# Patient Record
Sex: FEMALE | Race: WHITE
[De-identification: names, ages, dates, MRNs, and addresses within clinical notes are randomized per-mention and may not be internally consistent; named-entity substitution may affect disease eponyms.]

---

## 2021-02-19 ENCOUNTER — HOSPITAL ENCOUNTER (OUTPATIENT)
Dept: HOSPITAL 95 - ORSCSDS | Age: 56
Discharge: HOME | End: 2021-02-19
Attending: INTERNAL MEDICINE
Payer: COMMERCIAL

## 2021-02-19 VITALS — BODY MASS INDEX: 44.38 KG/M2 | WEIGHT: 244.27 LBS | HEIGHT: 62.01 IN

## 2021-02-19 DIAGNOSIS — I10: ICD-10-CM

## 2021-02-19 DIAGNOSIS — D12.4: ICD-10-CM

## 2021-02-19 DIAGNOSIS — K64.8: ICD-10-CM

## 2021-02-19 DIAGNOSIS — Z12.11: Primary | ICD-10-CM

## 2021-02-19 DIAGNOSIS — E66.01: ICD-10-CM

## 2021-02-19 DIAGNOSIS — K57.30: ICD-10-CM

## 2021-02-19 DIAGNOSIS — Z79.899: ICD-10-CM

## 2021-02-19 PROCEDURE — 0DBM8ZX EXCISION OF DESCENDING COLON, VIA NATURAL OR ARTIFICIAL OPENING ENDOSCOPIC, DIAGNOSTIC: ICD-10-PCS | Performed by: INTERNAL MEDICINE

## 2021-10-04 ENCOUNTER — HOSPITAL ENCOUNTER (OUTPATIENT)
Dept: HOSPITAL 95 - LAB SHORT | Age: 56
Discharge: HOME | End: 2021-10-04
Attending: STUDENT IN AN ORGANIZED HEALTH CARE EDUCATION/TRAINING PROGRAM
Payer: COMMERCIAL

## 2021-10-04 DIAGNOSIS — L57.0: Primary | ICD-10-CM

## 2022-10-18 ENCOUNTER — HOSPITAL ENCOUNTER (INPATIENT)
Dept: HOSPITAL 95 - ER | Age: 57
LOS: 3 days | Discharge: HOME | DRG: 917 | End: 2022-10-21
Attending: STUDENT IN AN ORGANIZED HEALTH CARE EDUCATION/TRAINING PROGRAM | Admitting: STUDENT IN AN ORGANIZED HEALTH CARE EDUCATION/TRAINING PROGRAM
Payer: COMMERCIAL

## 2022-10-18 VITALS — BODY MASS INDEX: 32.24 KG/M2 | HEIGHT: 66 IN | WEIGHT: 200.62 LBS

## 2022-10-18 DIAGNOSIS — D63.1: ICD-10-CM

## 2022-10-18 DIAGNOSIS — Z91.030: ICD-10-CM

## 2022-10-18 DIAGNOSIS — E66.9: ICD-10-CM

## 2022-10-18 DIAGNOSIS — Z88.8: ICD-10-CM

## 2022-10-18 DIAGNOSIS — G92.8: ICD-10-CM

## 2022-10-18 DIAGNOSIS — J96.01: ICD-10-CM

## 2022-10-18 DIAGNOSIS — Z90.710: ICD-10-CM

## 2022-10-18 DIAGNOSIS — M06.852: ICD-10-CM

## 2022-10-18 DIAGNOSIS — Z88.2: ICD-10-CM

## 2022-10-18 DIAGNOSIS — Z79.899: ICD-10-CM

## 2022-10-18 DIAGNOSIS — M06.851: ICD-10-CM

## 2022-10-18 DIAGNOSIS — F32.A: ICD-10-CM

## 2022-10-18 DIAGNOSIS — F41.8: ICD-10-CM

## 2022-10-18 DIAGNOSIS — M06.861: ICD-10-CM

## 2022-10-18 DIAGNOSIS — E83.39: ICD-10-CM

## 2022-10-18 DIAGNOSIS — G47.33: ICD-10-CM

## 2022-10-18 DIAGNOSIS — N18.30: ICD-10-CM

## 2022-10-18 DIAGNOSIS — E87.29: ICD-10-CM

## 2022-10-18 DIAGNOSIS — N39.0: ICD-10-CM

## 2022-10-18 DIAGNOSIS — J44.9: ICD-10-CM

## 2022-10-18 DIAGNOSIS — J96.02: ICD-10-CM

## 2022-10-18 DIAGNOSIS — Z98.890: ICD-10-CM

## 2022-10-18 DIAGNOSIS — Z88.0: ICD-10-CM

## 2022-10-18 DIAGNOSIS — T40.601A: Primary | ICD-10-CM

## 2022-10-18 DIAGNOSIS — N17.9: ICD-10-CM

## 2022-10-18 DIAGNOSIS — E88.09: ICD-10-CM

## 2022-10-18 DIAGNOSIS — M06.862: ICD-10-CM

## 2022-10-18 DIAGNOSIS — I12.9: ICD-10-CM

## 2022-10-18 LAB
ALBUMIN SERPL BCP-MCNC: 3.3 G/DL (ref 3.4–5)
ALBUMIN SERPL BCP-MCNC: 3.4 G/DL (ref 3.4–5)
ALBUMIN/GLOB SERPL: 0.9 {RATIO} (ref 0.8–1.8)
ALT SERPL W P-5'-P-CCNC: 22 U/L (ref 12–78)
ANION GAP SERPL CALCULATED.4IONS-SCNC: 7 MMOL/L (ref 6–16)
ANION GAP SERPL CALCULATED.4IONS-SCNC: 9 MMOL/L (ref 6–16)
AST SERPL W P-5'-P-CCNC: 24 U/L (ref 12–37)
BASOPHILS # BLD AUTO: 0.03 K/MM3 (ref 0–0.23)
BASOPHILS NFR BLD AUTO: 0 % (ref 0–2)
BENZODIAZ UR-MCNC: DETECTED UG/L
BILIRUB SERPL-MCNC: 0.3 MG/DL (ref 0.1–1)
BILIRUB UR QL STRIP: (no result)
BUN SERPL-MCNC: 22 MG/DL (ref 8–24)
BUN SERPL-MCNC: 23 MG/DL (ref 8–24)
CALCIUM SERPL-MCNC: 9.2 MG/DL (ref 8.5–10.1)
CALCIUM SERPL-MCNC: 9.5 MG/DL (ref 8.5–10.1)
CHLORIDE SERPL-SCNC: 109 MMOL/L (ref 98–108)
CHLORIDE SERPL-SCNC: 110 MMOL/L (ref 98–108)
CO2 SERPL-SCNC: 22 MMOL/L (ref 21–32)
CO2 SERPL-SCNC: 24 MMOL/L (ref 21–32)
CREAT SERPL-MCNC: 3.39 MG/DL (ref 0.4–1)
CREAT SERPL-MCNC: 3.73 MG/DL (ref 0.4–1)
DEPRECATED RDW RBC AUTO: 50.2 FL (ref 35.1–46.3)
EOSINOPHIL # BLD AUTO: 0.16 K/MM3 (ref 0–0.68)
EOSINOPHIL NFR BLD AUTO: 2 % (ref 0–6)
ERYTHROCYTE [DISTWIDTH] IN BLOOD BY AUTOMATED COUNT: 14.6 % (ref 11.7–14.2)
ETHANOL SERPL-MCNC: <3 MG/DL
GLOBULIN SER CALC-MCNC: 3.6 G/DL (ref 2.2–4)
GLUCOSE SERPL-MCNC: 117 MG/DL (ref 70–99)
GLUCOSE SERPL-MCNC: 155 MG/DL (ref 70–99)
GLUCOSE UR-MCNC: (no result) MG/DL
HCT VFR BLD AUTO: 39 % (ref 33–51)
HGB BLD-MCNC: 12.2 G/DL (ref 11.5–16)
IMM GRANULOCYTES # BLD AUTO: 0.02 K/MM3 (ref 0–0.1)
IMM GRANULOCYTES NFR BLD AUTO: 0 % (ref 0–1)
KETONES UR STRIP-MCNC: (no result) MG/DL
LEUKOCYTE ESTERASE UR QL STRIP: (no result)
LYMPHOCYTES # BLD AUTO: 1.92 K/MM3 (ref 0.84–5.2)
LYMPHOCYTES NFR BLD AUTO: 20 % (ref 21–46)
MCHC RBC AUTO-ENTMCNC: 31.3 G/DL (ref 31.5–36.5)
MCV RBC AUTO: 92 FL (ref 80–100)
METHADONE UR-MCNC: DETECTED UG/L
MONOCYTES # BLD AUTO: 0.43 K/MM3 (ref 0.16–1.47)
MONOCYTES NFR BLD AUTO: 4 % (ref 4–13)
NEUTROPHILS # BLD AUTO: 7.28 K/MM3 (ref 1.96–9.15)
NEUTROPHILS NFR BLD AUTO: 74 % (ref 41–73)
NRBC # BLD AUTO: 0 K/MM3 (ref 0–0.02)
NRBC BLD AUTO-RTO: 0 /100 WBC (ref 0–0.2)
OPIATES UR-MCNC: DETECTED NG/ML
PH BLDA: 7.23 [PH] (ref 7.35–7.45)
PH BLDA: 7.27 [PH] (ref 7.35–7.45)
PH BLDA: 7.39 [PH] (ref 7.35–7.45)
PH BLDV: 7.23 [PH] (ref 7.34–7.37)
PHOSPHATE SERPL-MCNC: 4.2 MG/DL (ref 2.5–4.9)
PLATELET # BLD AUTO: 185 K/MM3 (ref 150–400)
POTASSIUM SERPL-SCNC: 4 MMOL/L (ref 3.5–5.5)
POTASSIUM SERPL-SCNC: 4.6 MMOL/L (ref 3.5–5.5)
PROT SERPL-MCNC: 6.9 G/DL (ref 6.4–8.2)
PROT UR STRIP-MCNC: (no result) MG/DL
PROTHROMBIN TIME: 10.4 SEC (ref 9.7–11.5)
RBC #/AREA URNS HPF: (no result) /HPF (ref 0–2)
SODIUM SERPL-SCNC: 140 MMOL/L (ref 136–145)
SODIUM SERPL-SCNC: 141 MMOL/L (ref 136–145)
SP GR SPEC: 1.02 (ref 1–1.02)
URN SPEC COLLECT METH UR: (no result)
UROBILINOGEN UR STRIP-MCNC: (no result) MG/DL
WBC #/AREA URNS HPF: (no result) /HPF (ref 0–5)

## 2022-10-18 PROCEDURE — G0480 DRUG TEST DEF 1-7 CLASSES: HCPCS

## 2022-10-18 PROCEDURE — 0BH17EZ INSERTION OF ENDOTRACHEAL AIRWAY INTO TRACHEA, VIA NATURAL OR ARTIFICIAL OPENING: ICD-10-PCS | Performed by: STUDENT IN AN ORGANIZED HEALTH CARE EDUCATION/TRAINING PROGRAM

## 2022-10-18 PROCEDURE — 5A1945Z RESPIRATORY VENTILATION, 24-96 CONSECUTIVE HOURS: ICD-10-PCS | Performed by: STUDENT IN AN ORGANIZED HEALTH CARE EDUCATION/TRAINING PROGRAM

## 2022-10-18 PROCEDURE — A9270 NON-COVERED ITEM OR SERVICE: HCPCS

## 2022-10-18 PROCEDURE — C9113 INJ PANTOPRAZOLE SODIUM, VIA: HCPCS

## 2022-10-18 NOTE — NUR
PT NEW ADMIT TO ICU AROUND 1830. PT INTUBATED AND SEDATED FAMILY NOT IN THE
ROOM. DR TRINIDAD DID SPEAK WITH FAMILY MEMBER AND THERE IS CONCERN THAT THIS
COULD HAVE BEEN AND INTENTIONAL OD POSSIBLE SUICIDE ATTEMPT. MEDICATIONS WERE
WITH PT BELONGINGS SENT THEM TO PHARMACY TO BE REVIEWED AND STORED. PT
CURRENTLY ON 10 MCG PROPOFOL WILL OPEN EYES TO VOICE NOT FOLLOWING COMMANDS AT
THIS TIME. WILL CONTINUE TO MONITOR.

## 2022-10-18 NOTE — NUR
PT ARRIVED TO ICU AT 1835.
 
INTUBATED IN ER, ARRIVED WITH RN AND RT AT BEDSIDE. DAUGHTER VERIFIED IDENTITY
AT BEDSIDE. LEVOPHED STOPPED UPON ARRIVAL DUE TO . PT IN SOFT BILAT
WRIST RESTRAINTS. SKIN CHECK COMPLETED WITH SHI DASH. NO SKIN BREAKDOWN
IDENTIFIED. 2 LARGE MEDICATION PATCHES REMOVED FROM LOWER BACK. DAUGHTER LEFT
MEDICATIONS AT BEDSIDE FOR VERIFICATION/RECONCILIATION PURPOSES. PIV LEFT
FOREARM REMOVED DUE TO INFILTRATION. PIV TO RIGHT HAND AND LEFT HAND.
REPORT GIVEN TO ONCOMING RN.

## 2022-10-19 LAB
ALBUMIN SERPL BCP-MCNC: 3 G/DL (ref 3.4–5)
ANION GAP SERPL CALCULATED.4IONS-SCNC: 11 MMOL/L (ref 6–16)
BUN SERPL-MCNC: 20 MG/DL (ref 8–24)
CALCIUM SERPL-MCNC: 10.1 MG/DL (ref 8.5–10.1)
CHLORIDE SERPL-SCNC: 110 MMOL/L (ref 98–108)
CO2 SERPL-SCNC: 20 MMOL/L (ref 21–32)
CREAT SERPL-MCNC: 2.05 MG/DL (ref 0.4–1)
DEPRECATED RDW RBC AUTO: 46.5 FL (ref 35.1–46.3)
ERYTHROCYTE [DISTWIDTH] IN BLOOD BY AUTOMATED COUNT: 14.3 % (ref 11.7–14.2)
GLUCOSE SERPL-MCNC: 220 MG/DL (ref 70–99)
HCT VFR BLD AUTO: 40 % (ref 33–51)
HGB BLD-MCNC: 12.8 G/DL (ref 11.5–16)
MAGNESIUM SERPL-MCNC: 2 MG/DL (ref 1.6–2.4)
MCHC RBC AUTO-ENTMCNC: 32 G/DL (ref 31.5–36.5)
MCV RBC AUTO: 89 FL (ref 80–100)
NRBC # BLD AUTO: 0 K/MM3 (ref 0–0.02)
NRBC BLD AUTO-RTO: 0 /100 WBC (ref 0–0.2)
PHOSPHATE SERPL-MCNC: 0.8 MG/DL (ref 2.5–4.9)
PLATELET # BLD AUTO: 207 K/MM3 (ref 150–400)
POTASSIUM SERPL-SCNC: 3.6 MMOL/L (ref 3.5–5.5)
SODIUM SERPL-SCNC: 141 MMOL/L (ref 136–145)

## 2022-10-19 NOTE — NUR
PT OPENED EYES THIS MORNING FOLLOWED ALL COMMANDS WAS ABLE TO SQUEEZE WITH
BILAT HANDS THIS RN HAND. PT ABLE TO WIGGLE TOES AND NOD YES. WILL GIVE REPORT
TO ONCOMING RN.

## 2022-10-19 NOTE — NUR
END OF SHIFT NOTE
 
ASSUMED CARE OF PT AT 1430. ALERT AND FOLLOWING COMMANDS OFF SEDATION.
TOLERATING VENT SETTINGS. REPOSITIONED EVERY 2 HOURS. FAMILY (MOTHER AND
MOTHER IN LAW) IN TO VISIT. EDUCATED ON PLAN OF CARE. ALL QUESTIONS ANSWERED.
SEDATION RESTARTED PER MD REQUEST, PLAN FOR EXTUBATION TOMORROW. REPORT GIVEN
TO ONCOMING RN.

## 2022-10-19 NOTE — NUR
PT'S PROPOFOL DOWN TO 10MCG/KG PER  REQUEST. SHE IS MORE ALERT, DROWSY.
EYES ARE TEARFUL, NOT ANSWERING QUESTIONS AT THIS TIME. SISTER AT THE BEDSIDE.

## 2022-10-19 NOTE — NUR
END OF SHIFT SUMMARY
NO ACUTE CHANGES OVERNIGHT. PT BP REMAINED STABLE OFF LEVO. PT ON 20 OF
PROPOFOL AND APPEARS COMFORTABLE AND TOLERATING VENT. PHOS CAME BACK LOW AT
0.8 DR. TAFOYA NOTIFIED AND REPLACEMENTS ORDERED. WILL CONTINUE TO MONITOR.
WILL GIVE BEDSIDE REPORT TO ONCOMING RN.

## 2022-10-19 NOTE — NUR
ASSESSMENT:
PT SEDATED, BUT OPENS EYES TO VERBAL STIMULI AND ATTEMPTS TO NOD HEAD.  IS
QUITE RESTLESS WITH HER LEGS. PAULINE AT 3MM BILAT. BILAT WRIST RESTRAINTS ON.
LS COARSE T/O AND DIMINSHD NTHE BASES WITH BIOX 93% ON VENT SETTINGS AC 22, TV
360, FIO2 30% AND PEEP5. ETT 7.5 23 AT LIP.  PEAK PRESSURES 22 AND ETCO2 23.
SKIN PINK WARM AND DRY WITH PPP BILAT. HANSON TEMP READING 100.1; FAN ON WITH
ONLY A GOWN ON. HEART SOUNDS DISTANT WITH MOITOR SHOWING NSR WITH HR 63. 20G
IV R HAND S/L. 20G IV L HAND WITH LR RUNNING AT 100CC/HR ND PROPOFOL AT
20MCG/KG/MIN= 14CC/HR.
ABD R/D WITH HYPO BTX4. OG TO LIS AND DRAINING DARK BILE.
HANSON DRAINING CLEAR YELLOW UINE.

## 2022-10-19 NOTE — NUR
PT WEDDING RING
PT HAD WEDDING RING ON THAT WAS GETTING TIGHT. REMOVED PUT IN SPECIMEN CUP
WITH PT LABEL AND PUT IT IN HER BLACK AND RED BAG THAT DAUGHTER BROUGHT
MEDICATIONS IN. THE BAG IS ON HER SHELF IN HER ROOM NEXT TO HER BELONGING BAG.
PT MEDICATIONS WERE TAKEN TO PHARMACY.

## 2022-10-19 NOTE — NUR
RECEIVED BEDSIDE REPORT FROM SHI LOBATO, [facility] HERE TO PERFORM ORDERED TEST.
PT ON VENT AC 22/360/8/35%. PROPOFOL @ 20MCG/KG, PHOS REPLACEMENT INFUSING.
ASSIST TO GET LEADS ON AS PT IS LEFT FACING WITH STRONG PULL,  IN TO
SEE PT. ORDERS RECEIVED. PT NOW UNDERGOING EEG. PROPOFOL ON STANDBY, FULL
ASSESSMENT TO FOLLOW, SEE CHARTING.

## 2022-10-19 NOTE — NUR
ASSUMED CARE OF PT AT 1430.
 
PT IS ALERT AND ORIENTED. HAS MISSED DIALYSIS TODAY DUE TO BEING IN THE
HOSPITAL, SHE IS NORMALLY ON A M-W-F SCHEDULE OUTPT. PER REPORT, PT'S PROXIMAL
PORT WHERE HEPARIN WAS INFUSING WAS OCCLUDED. INSTRUCTIONS FROM CATH LAB/DR. MOORE WERE TO MOVE HEPARIN TO THE PERIPHERAL IV LINE AND KEEP TPA INFUSING.
PT STATES PAIN IN ARM HAS LESSENED. PT TAKEN BACK TO CATH LAB AT 1445.
 
RN TO CONTINUE TO MONITOR.

## 2022-10-20 LAB
ALBUMIN SERPL BCP-MCNC: 2.7 G/DL (ref 3.4–5)
ANION GAP SERPL CALCULATED.4IONS-SCNC: 6 MMOL/L (ref 6–16)
BUN SERPL-MCNC: 18 MG/DL (ref 8–24)
CALCIUM SERPL-MCNC: 9.4 MG/DL (ref 8.5–10.1)
CHLORIDE SERPL-SCNC: 111 MMOL/L (ref 98–108)
CO2 SERPL-SCNC: 25 MMOL/L (ref 21–32)
CREAT SERPL-MCNC: 1.38 MG/DL (ref 0.4–1)
DEPRECATED RDW RBC AUTO: 46.9 FL (ref 35.1–46.3)
ERYTHROCYTE [DISTWIDTH] IN BLOOD BY AUTOMATED COUNT: 14.7 % (ref 11.7–14.2)
GLUCOSE SERPL-MCNC: 117 MG/DL (ref 70–99)
HCT VFR BLD AUTO: 40.3 % (ref 33–51)
HGB BLD-MCNC: 13.3 G/DL (ref 11.5–16)
MAGNESIUM SERPL-MCNC: 1.7 MG/DL (ref 1.6–2.4)
MCHC RBC AUTO-ENTMCNC: 33 G/DL (ref 31.5–36.5)
MCV RBC AUTO: 87 FL (ref 80–100)
NRBC # BLD AUTO: 0 K/MM3 (ref 0–0.02)
NRBC BLD AUTO-RTO: 0 /100 WBC (ref 0–0.2)
PHOSPHATE SERPL-MCNC: 2.4 MG/DL (ref 2.5–4.9)
PLATELET # BLD AUTO: 214 K/MM3 (ref 150–400)
POTASSIUM SERPL-SCNC: 4.1 MMOL/L (ref 3.5–5.5)
SODIUM SERPL-SCNC: 142 MMOL/L (ref 136–145)

## 2022-10-20 NOTE — NUR
SHIFT SUMMARY:
PT HAS BEEN ON 20MCG/KG/HR OF PROPOFOL THIS AM AND IS AWAKE AND RESPONSIVE.
FOLLOWING COMMANDS AND SLOW TO RESPOND. PT IS VERY RIGID WITH TURNS.  WILL TRY
FOR A WEAN LATER THIS AM.

## 2022-10-20 NOTE — NUR
REPORT TO JAYDEN PERKINS RN FOR PATIENT TO BE TRANSFERRED TO PCU 16. PT MADE AWARE
OF MOVE, UP TO BSC, PT HAD BM THEN TRANSFERRED TO WHEELCHAIR AND MOVED TO PCU
16 WITH THE PCT'S. PT VERBALLY SAID GOODBYE

## 2022-10-20 NOTE — NUR
CAME TO SEE MOY POST EXTUBATION.  DC'D THE SITTER AND
SAID SHE COULD BE PCU STATUS. SHE RETURNED TO THE BED WITH SBA USING THE
WALKER, FOLLOWING COMMANDS. THE CATHETER WAS REMOVED BY THE STUDENT NURSE AND
THE AMOUNT RECORDED. PT JOVIAL AND ENGAGING. DENIES ANY REQUESTS AT THIS TIME.

## 2022-10-20 NOTE — NUR
CAME TO EVALUATE THE PATIENT, DETERMINED SHE IS ABLE TO BE EXTUBATED
AT THIS TIME, RESPIRATORY THERAPY CALLED, ZIA SURESH CAME AND PT WAS EXTUBATED
AT 1007. SHE IS ALERT AND COOPERATIVE. SHE IS PLACED IN 1:1 OBSERVATION FOR
INTENTIONAL OD. EXPLAINED TO PATIENT.

## 2022-10-20 NOTE — NUR
Patient arrived from ICU, in wheelchair. Transferred from wheelchair to
recliner at bedside with PCT assistance, no walker needed. Moderate
assistance.  PT is alert, oriented, on room air, and no dyspnea noted. Lung
sounds are clear to auscultation.  STates that she is coughing up a lot of
sputum today. Yankauer given to her attached to low/med suction to evacuate
secretions ad alee.
4 family members of varying ages arrived with family dog to visit the patient.

## 2022-10-20 NOTE — NUR
Pt remains in normal sinus rhythm, 69 bpm.  No c/o any discomfort. Ambulatory
to the bathroom without difficulty, no symptoms.

## 2022-10-20 NOTE — NUR
REASSESS:
PT IS VERY RIGID AND STIFF WITH TURNS. EASILY AROUSED BUT DRIFTS BACK ASLEEP
QUICKLY. NO ACUTE CHANGES.

## 2022-10-20 NOTE — NUR
Pt. is awake in bed and welcomes my visit. Pts. sister is present. Pt. is
unsettled about many family losses in the past year, including her spouse. Pt.
verbalizes that she is a woman of luis armando. Listen empathetically with a calming
presence. Pt. displays evidence of being appropriately cathartic. Pastoral
 is given addressing the real impact of grief, and the promise of
hope.  Pt. displayed evidence of understanding and agreement. Prayed with Pt.
Pt. verbalized gratitude for the spiritual care visit.

## 2022-10-21 LAB
ALBUMIN SERPL BCP-MCNC: 2.9 G/DL (ref 3.4–5)
ANION GAP SERPL CALCULATED.4IONS-SCNC: 6 MMOL/L (ref 6–16)
BUN SERPL-MCNC: 17 MG/DL (ref 8–24)
CALCIUM SERPL-MCNC: 9.7 MG/DL (ref 8.5–10.1)
CHLORIDE SERPL-SCNC: 114 MMOL/L (ref 98–108)
CO2 SERPL-SCNC: 25 MMOL/L (ref 21–32)
CREAT SERPL-MCNC: 1.28 MG/DL (ref 0.4–1)
GLUCOSE SERPL-MCNC: 100 MG/DL (ref 70–99)
HCT VFR BLD AUTO: 34.8 % (ref 33–51)
HGB BLD-MCNC: 11.3 G/DL (ref 11.5–16)
MAGNESIUM SERPL-MCNC: 1.9 MG/DL (ref 1.6–2.4)
PHOSPHATE SERPL-MCNC: 3.3 MG/DL (ref 2.5–4.9)
POTASSIUM SERPL-SCNC: 3.7 MMOL/L (ref 3.5–5.5)
SODIUM SERPL-SCNC: 145 MMOL/L (ref 136–145)

## 2022-10-21 NOTE — NUR
SHIFT SUMMARY
 
PT IS A/Ox4 AND IS COOPERATIVE WITH CARE GIVEN BY STAFF. PT WAS EXTUBATED ON
10/20/2022 AND TRANSFERRED TO PCU DURING DAYSHIFT. PT'S MENTATION HAS
SIGNIFICANTLY IMPROVED SINCE HER STAY IN THE ICU WITH PT BEING ABLE TO ANSWER
ALL OF MY QUESTIONS APPROPRIATELY. PT MAINTAINS SPO2 >95% ON RA WITH NO SOB OR
DYSPNEA REPORTED WITH AMBUALTION. TOLERATES AMBULATION WELL, BUT CAN BE A
LITTLE WEAK AT TIMES. READING THROUGH PT'S CHART, IT APPEARS SHE MAY BENEFIT
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
MEMBERS. BP AND HE HAVE BEEN STABLE WITH NO INTERVENTION NEEDED AT THIS TIME.
PT'S TOX SCREEN CAME BACK POSTIVE FOR A NUMBER OF MEDICATIONS INCLUDING
OPIODS, METHADONE, TRICYCLICS, AND BENZOS. NADN AND VSS STABLE T/O THE SHIFT

## 2022-10-21 NOTE — NUR
Spiritual Care Consult
Pt. is awake and sitting in a recliner and welcomes my visit. Pts. daughter is
present. Pt. displays evidence of being rested and enaged with her recovery.
Pts. daughter verbalized seious luis armando concerns due to health afflictions
of her daughter (Pts. grandduaghter).  Listen empathetically with a calming
presence.  Pt. grabbed my hand, and I prayed with pt. Pt. verbalized gratitude
for the spiritual care visit.

## 2022-12-06 ENCOUNTER — HOSPITAL ENCOUNTER (OUTPATIENT)
Dept: HOSPITAL 95 - LAB SHORT | Age: 57
End: 2022-12-06
Attending: STUDENT IN AN ORGANIZED HEALTH CARE EDUCATION/TRAINING PROGRAM
Payer: COMMERCIAL

## 2022-12-06 DIAGNOSIS — Z01.419: Primary | ICD-10-CM

## 2022-12-06 PROCEDURE — G0123 SCREEN CERV/VAG THIN LAYER: HCPCS

## 2022-12-14 LAB — OTHER STN SPEC: (no result)
